# Patient Record
Sex: FEMALE
[De-identification: names, ages, dates, MRNs, and addresses within clinical notes are randomized per-mention and may not be internally consistent; named-entity substitution may affect disease eponyms.]

---

## 2018-04-26 ENCOUNTER — APPOINTMENT (OUTPATIENT)
Dept: HUMAN REPRODUCTION | Facility: CLINIC | Age: 43
End: 2018-04-26
Payer: COMMERCIAL

## 2018-04-26 PROCEDURE — 99205 OFFICE O/P NEW HI 60 MIN: CPT | Mod: 25

## 2018-04-26 PROCEDURE — 76830 TRANSVAGINAL US NON-OB: CPT

## 2018-04-26 PROCEDURE — 36415 COLL VENOUS BLD VENIPUNCTURE: CPT

## 2018-05-11 ENCOUNTER — APPOINTMENT (OUTPATIENT)
Dept: HUMAN REPRODUCTION | Facility: CLINIC | Age: 43
End: 2018-05-11

## 2018-05-23 ENCOUNTER — APPOINTMENT (OUTPATIENT)
Dept: HUMAN REPRODUCTION | Facility: CLINIC | Age: 43
End: 2018-05-23
Payer: COMMERCIAL

## 2018-05-23 PROCEDURE — 99215 OFFICE O/P EST HI 40 MIN: CPT | Mod: 25

## 2018-05-23 PROCEDURE — 36415 COLL VENOUS BLD VENIPUNCTURE: CPT

## 2018-06-06 ENCOUNTER — TRANSCRIPTION ENCOUNTER (OUTPATIENT)
Age: 43
End: 2018-06-06

## 2019-10-10 ENCOUNTER — FORM ENCOUNTER (OUTPATIENT)
Age: 44
End: 2019-10-10

## 2019-11-20 ENCOUNTER — FORM ENCOUNTER (OUTPATIENT)
Age: 44
End: 2019-11-20

## 2019-12-09 ENCOUNTER — FORM ENCOUNTER (OUTPATIENT)
Age: 44
End: 2019-12-09

## 2020-01-05 ENCOUNTER — FORM ENCOUNTER (OUTPATIENT)
Age: 45
End: 2020-01-05

## 2020-02-19 ENCOUNTER — FORM ENCOUNTER (OUTPATIENT)
Age: 45
End: 2020-02-19

## 2020-03-08 ENCOUNTER — FORM ENCOUNTER (OUTPATIENT)
Age: 45
End: 2020-03-08

## 2020-10-12 ENCOUNTER — FORM ENCOUNTER (OUTPATIENT)
Age: 45
End: 2020-10-12

## 2021-04-22 ENCOUNTER — NON-APPOINTMENT (OUTPATIENT)
Age: 46
End: 2021-04-22

## 2021-04-28 ENCOUNTER — TRANSCRIPTION ENCOUNTER (OUTPATIENT)
Age: 46
End: 2021-04-28

## 2021-04-28 ENCOUNTER — APPOINTMENT (OUTPATIENT)
Dept: BREAST CENTER | Facility: CLINIC | Age: 46
End: 2021-04-28
Payer: COMMERCIAL

## 2021-04-28 VITALS
HEIGHT: 64 IN | HEART RATE: 70 BPM | BODY MASS INDEX: 20.49 KG/M2 | WEIGHT: 120 LBS | DIASTOLIC BLOOD PRESSURE: 89 MMHG | SYSTOLIC BLOOD PRESSURE: 131 MMHG

## 2021-04-28 DIAGNOSIS — R92.8 OTHER ABNORMAL AND INCONCLUSIVE FINDINGS ON DIAGNOSTIC IMAGING OF BREAST: ICD-10-CM

## 2021-04-28 PROCEDURE — 99072 ADDL SUPL MATRL&STAF TM PHE: CPT

## 2021-04-28 PROCEDURE — 99213 OFFICE O/P EST LOW 20 MIN: CPT

## 2021-04-29 NOTE — HISTORY OF PRESENT ILLNESS
[FreeTextEntry1] : 46 yo female previously under the care of Dr. Adams, presents for a history of left breast ALH & FEA s/p excisional biopsy on 2/20/2020; final surgical path revealed LCIS, ALH, and FEA. She is s/p mmg and US today, which recommended multiple biopsies. As per Dr. Anders, these lesions and lymph nodes are suspicious. Patient has no breast complaints today. Denies nipple discharge, nipple/breast skin changes or dimpling. Denies fever and chills.\par \par Patient had concerns about her COVID19 vaccine, discussed with Dr. Anders and decided that she should get the vaccine in her right arm. Patient aware.\par \par Discussed patient's left breast hx and recommendations for close follow up. Discussed patients high risk status and recommendations for close surveillance. Patient understands and would like to be followed closely.\par \par Discussed benefits of surveillance and well as implication of the sensitivity of breast MRI. Patient understands and agrees to go forward with MRI for breast cancer surveillance.\par \par

## 2021-04-29 NOTE — DATA REVIEWED
[FreeTextEntry1] : - 2/20/20 SURGERY L Needle Loc Lumpx - Path SITE 1 w/ calcs yielded foci of LCIS, classic type, and ALH. Sclerosing adenosis and columnar cell change with microcyst formations and associated calcs. Dense stromal fibrosis w/ focal PASH. Path SITE 2 w/ clip yielded LCIS, classic type, associated with calcs. Columnar cell change w/ FEA and calcs. Sclerosing adenosis w/ calcs. Dense stromal fibrosis.\par \par - 10/13/20 (R) dx b/l mmg and US: heterogenously dense breasts. Probably benign mass in the right breast at 9:00, 8 cm from the nipple measuring up to 0.5 cm. Recommend follow-up right targeted ultrasound in 6 months to establish short-term stability. Postsurgical changes following benign excision on the left. No mammographic evidence of malignancy in either breast. BI-RADS 3. \par \par - 4/28/21 (R) b/l breast US: BI-RADS 4.

## 2021-04-29 NOTE — PAST MEDICAL HISTORY
[Menarche Age ____] : age at menarche was [unfilled] [Definite ___ (Date)] : the last menstrual period was [unfilled] [Total Preg ___] : G[unfilled] [Live Births ___] : P[unfilled]  [Age At Live Birth ___] : Age at live birth: [unfilled] [FreeTextEntry7] : yes   [FreeTextEntry6] : yes IVF [FreeTextEntry8] : yes

## 2021-05-03 ENCOUNTER — RESULT REVIEW (OUTPATIENT)
Age: 46
End: 2021-05-03

## 2021-05-04 ENCOUNTER — NON-APPOINTMENT (OUTPATIENT)
Age: 46
End: 2021-05-04

## 2021-05-07 ENCOUNTER — NON-APPOINTMENT (OUTPATIENT)
Age: 46
End: 2021-05-07

## 2021-05-07 DIAGNOSIS — C50.911 MALIGNANT NEOPLASM OF UNSPECIFIED SITE OF RIGHT FEMALE BREAST: ICD-10-CM

## 2021-05-07 DIAGNOSIS — Z17.0 MALIGNANT NEOPLASM OF UNSPECIFIED SITE OF RIGHT FEMALE BREAST: ICD-10-CM

## 2021-05-11 ENCOUNTER — NON-APPOINTMENT (OUTPATIENT)
Age: 46
End: 2021-05-11

## 2021-05-25 ENCOUNTER — APPOINTMENT (OUTPATIENT)
Dept: BREAST CENTER | Facility: CLINIC | Age: 46
End: 2021-05-25

## 2021-10-20 ENCOUNTER — APPOINTMENT (OUTPATIENT)
Dept: BREAST CENTER | Facility: CLINIC | Age: 46
End: 2021-10-20